# Patient Record
Sex: FEMALE | Race: BLACK OR AFRICAN AMERICAN
[De-identification: names, ages, dates, MRNs, and addresses within clinical notes are randomized per-mention and may not be internally consistent; named-entity substitution may affect disease eponyms.]

---

## 2017-06-21 ENCOUNTER — HOSPITAL ENCOUNTER (EMERGENCY)
Dept: HOSPITAL 10 - FTE | Age: 37
Discharge: HOME | End: 2017-06-21
Payer: COMMERCIAL

## 2017-06-21 VITALS
HEIGHT: 66 IN | BODY MASS INDEX: 23.56 KG/M2 | BODY MASS INDEX: 23.56 KG/M2 | HEIGHT: 66 IN | WEIGHT: 146.61 LBS | WEIGHT: 146.61 LBS

## 2017-06-21 VITALS — DIASTOLIC BLOOD PRESSURE: 72 MMHG | SYSTOLIC BLOOD PRESSURE: 154 MMHG | RESPIRATION RATE: 18 BRPM | HEART RATE: 75 BPM

## 2017-06-21 DIAGNOSIS — R07.9: ICD-10-CM

## 2017-06-21 DIAGNOSIS — R10.9: Primary | ICD-10-CM

## 2017-06-21 LAB
ADD SCAN DIFF: NO
ADD UMIC: YES
ALBUMIN SERPL-MCNC: 4.8 G/DL (ref 3.3–4.9)
ALBUMIN/GLOB SERPL: 1.6 {RATIO}
ALP SERPL-CCNC: 56 IU/L (ref 42–121)
ALT SERPL-CCNC: 51 IU/L (ref 13–69)
ANION GAP SERPL CALC-SCNC: 13 MMOL/L (ref 8–16)
AST SERPL-CCNC: 49 IU/L (ref 15–46)
BASOPHILS # BLD AUTO: 0 10^3/UL (ref 0–0.1)
BASOPHILS NFR BLD: 1 % (ref 0–2)
BILIRUB DIRECT SERPL-MCNC: 0 MG/DL (ref 0–0.2)
BILIRUB SERPL-MCNC: 0 MG/DL (ref 0.2–1.3)
BUN SERPL-MCNC: 12 MG/DL (ref 7–20)
CALCIUM SERPL-MCNC: 8.9 MG/DL (ref 8.4–10.2)
CHLORIDE SERPL-SCNC: 104 MMOL/L (ref 97–110)
CO2 SERPL-SCNC: 25 MMOL/L (ref 21–31)
COLOR UR: (no result)
CREAT SERPL-MCNC: 0.74 MG/DL (ref 0.44–1)
EOSINOPHIL # BLD: 0.1 10^3/UL (ref 0–0.5)
EOSINOPHIL NFR BLD: 2.4 % (ref 0–7)
ERYTHROCYTE [DISTWIDTH] IN BLOOD BY AUTOMATED COUNT: 15.4 % (ref 11.5–14.5)
GLOBULIN SER-MCNC: 3 G/DL (ref 1.3–3.2)
GLUCOSE SERPL-MCNC: 78 MG/DL (ref 70–220)
GLUCOSE UR STRIP-MCNC: NEGATIVE %
HCT VFR BLD CALC: 35.4 % (ref 37–47)
HGB BLD-MCNC: 11.6 G/DL (ref 12–16)
KETONES UR STRIP.AUTO-MCNC: NEGATIVE MG/DL
LYMPHOCYTES # BLD AUTO: 1.4 10^3/UL (ref 0.8–2.9)
LYMPHOCYTES NFR BLD AUTO: 37.3 % (ref 15–51)
MCH RBC QN AUTO: 29.9 PG (ref 29–33)
MCHC RBC AUTO-ENTMCNC: 32.8 G/DL (ref 32–37)
MCV RBC AUTO: 91.2 FL (ref 82–101)
MONOCYTES # BLD: 0.4 10^3/UL (ref 0.3–0.9)
MONOCYTES NFR BLD: 11.3 % (ref 0–11)
NEUTROPHILS # BLD: 1.8 10^3/UL (ref 1.6–7.5)
NEUTROPHILS NFR BLD AUTO: 47.7 % (ref 39–77)
NITRITE UR QL STRIP.AUTO: NEGATIVE
NRBC # BLD MANUAL: 0 10^3/UL (ref 0–0)
NRBC BLD QL: 0 /100WBC (ref 0–0)
PLATELET # BLD: 245 10^3/UL (ref 140–415)
PMV BLD AUTO: 10.5 FL (ref 7.4–10.4)
POTASSIUM SERPL-SCNC: 4.8 MMOL/L (ref 3.5–5.1)
PROT SERPL-MCNC: 7.8 G/DL (ref 6.1–8.1)
RBC # BLD AUTO: 3.88 10^6/UL (ref 4.2–5.4)
RBC # UR AUTO: (no result) /UL
RBC #/AREA URNS HPF: (no result) /HPF
SODIUM SERPL-SCNC: 137 MMOL/L (ref 135–144)
SQUAMOUS #/AREA URNS HPF: (no result) /HPF
UR BILIRUBIN (DIP): NEGATIVE
UR CLARITY: CLEAR
UR TOTAL PROTEIN (DIP): NEGATIVE
UROBILINOGEN UR STRIP-ACNC: (no result) (ref 0.1–1)
WBC # BLD AUTO: 3.8 10^3/UL (ref 4.8–10.8)
WBC # UR STRIP: NEGATIVE /UL

## 2017-06-21 PROCEDURE — 81001 URINALYSIS AUTO W/SCOPE: CPT

## 2017-06-21 PROCEDURE — 80053 COMPREHEN METABOLIC PANEL: CPT

## 2017-06-21 PROCEDURE — 74177 CT ABD & PELVIS W/CONTRAST: CPT

## 2017-06-21 PROCEDURE — 84703 CHORIONIC GONADOTROPIN ASSAY: CPT

## 2017-06-21 PROCEDURE — 93005 ELECTROCARDIOGRAM TRACING: CPT

## 2017-06-21 PROCEDURE — 85025 COMPLETE CBC W/AUTO DIFF WBC: CPT

## 2017-06-21 PROCEDURE — 71010: CPT

## 2017-06-21 PROCEDURE — 83690 ASSAY OF LIPASE: CPT

## 2017-06-21 NOTE — ERD
ER Documentation


Chief Complaint


Date/Time


DATE: 17 


TIME: 17:50


Chief Complaint


has ap hx of hernia and cp that feels like pressure hx of anxiety 


 


 (JOANNE FOWLER)





HPI


This is a 36-year-old female presents to the ER with abdominal pain that is 

sharp in quality located in the mid abdomen. Patient has had this pain over the 

last 2 days. She denies nausea vomiting or diarrhea. She does admit to a 

squeezing chest pain with a little bit of shortness of breath. She denies any 

recent travel she denies any leg pain or leg swelling. She does not have any 

cough or cold symptoms. Patient denies any urinary frequency or dysuria.


 (JOANNE FOWLER)





ROS


All12 point review of systems was done, all negative except per HPI.


 (JOANNE FOWLER)





Medications


Home Meds


Active Scripts


Ibuprofen* (Motrin*) 600 Mg Tab, 600 MG PO Q6, #30 TAB


   Prov:JOANNE FOWLER         17


Hydrocodone/Acetaminophen (Norco 5-325 Tablet) 1 Each Tablet, 1 TAB PO Q6H Y 

for PAIN, #15 TAB


   Prov:JOANNE FOWLER         17





PMhx/Soc


Medical and Surgical Hx:  pt denies Medical Hx, pt denies Surgical Hx


Hx Alcohol Use:  No


Hx Tobacco Use:  No


Smoking Status:  Never smoker


 (JOANNE FOWLER)





Physical Exam


Vitals





Vital Signs








  Date Time  Temp Pulse Resp B/P Pulse Ox O2 Delivery O2 Flow Rate FiO2


 


17 12:45 98.3 86 18 134/83 99   





 (ITZEL TURCIOS PA-C)


Physical Exam


GENERAL: The patient is well developed and appropriate for usual state of health

, in no apparent distress.


HEENT: Atraumatic. 


CHEST: Clear to auscultation bilaterally. There are no rales, wheezes or 

rhonchi. 


HEART: Regular rate and rhythm. No murmurs, clicks, rubs or gallops.


ABDOMEN: Soft, nontender and nondistended. Good bowel sounds. No rebound or 

guarding. No gross peritonitis. No gross organomegaly or masses. No Escalera sign 

or McBurney point tenderness.


BACK: No midline or flank tenderness.


NEURO: Alert and oriented.


SKIN: The skin is warm and dry.


 (JOANNE FOWLER)


Result Diagram:  


17 1625                                                                   

             6/21/17 1524





Results 24 hrs





 Laboratory Tests








Test


  17


15:06 17


15:24 17


16:25


 


Urine Color LT. YELLOW   


 


Urine Clarity CLEAR   


 


Urine pH    


 


Urine Specific Gravity    


 


Urine Ketones NEGATIVE   


 


Urine Nitrite NEGATIVE   


 


Urine Bilirubin NEGATIVE   


 


Urine Urobilinogen 0.2  E.U./dL   


 


Urine Leukocyte Esterase NEGATIVE   


 


Urine Microscopic RBC 0-2/HPF   


 


Urine Microscopic WBC 0-2/HPF   


 


Urine Squamous Epithelial


Cells MODERATE/HPF 


  


  


 


 


Urine Hemoglobin 1+   


 


Urine Glucose NEGATIVE%   


 


Urine Total Protein NEGATIVE   


 


Urine Pregnancy Test NEGATIVE   


 


Sodium Level  137mmol/L  


 


Potassium Level  4.8mmol/L  


 


Chloride Level  104mmol/L  


 


Carbon Dioxide Level  25mmol/L  


 


Anion Gap  13  


 


Blood Urea Nitrogen  12mg/dl  


 


Creatinine  0.74mg/dl  


 


Glucose Level  78mg/dl  


 


Calcium Level  8.9mg/dl  


 


Total Bilirubin  0.0mg/dl  


 


Direct Bilirubin  0.00mg/dl  


 


Indirect Bilirubin  0.0mg/dl  


 


Aspartate Amino Transf


(AST/SGOT) 


  49IU/L 


  


 


 


Alanine Aminotransferase


(ALT/SGPT) 


  51IU/L 


  


 


 


Alkaline Phosphatase  56IU/L  


 


Total Protein  7.8g/dl  


 


Albumin  4.8g/dl  


 


Globulin  3.00g/dl  


 


Albumin/Globulin Ratio  1.60  


 


Lipase  128U/L  


 


White Blood Count   3.810^3/ul 


 


Red Blood Count   3.8810^6/ul 


 


Hemoglobin   11.6g/dl 


 


Hematocrit   35.4% 


 


Mean Corpuscular Volume   91.2fl 


 


Mean Corpuscular Hemoglobin   29.9pg 


 


Mean Corpuscular Hemoglobin


Concent 


  


  32.8g/dl 


 


 


Red Cell Distribution Width   15.4% 


 


Platelet Count   15788^3/UL 


 


Mean Platelet Volume   10.5fl 


 


Neutrophils %   47.7% 


 


Lymphocytes %   37.3% 


 


Monocytes %   11.3% 


 


Eosinophils %   2.4% 


 


Basophils %   1.0% 


 


Nucleated Red Blood Cells %   0.0/100WBC 


 


Neutrophils #   1.810^3/ul 


 


Lymphocytes #   1.410^3/ul 


 


Monocytes #   0.410^3/ul 


 


Eosinophils #   0.110^3/ul 


 


Basophils #   0.010^3/ul 


 


Nucleated Red Blood Cells #   0.010^3/ul 








 Current Medications








 Medications


  (Trade)  Dose


 Ordered  Sig/Sherron


 Route


 PRN Reason  Start Time


 Stop Time Status Last Admin


Dose Admin


 


 IV Flush 10 ml  10 ml  STK-MED ONCE


 .ROUTE


   17 17:23


 17 17:24 DC 17 18:11


 


 


 Sodium Chloride


  (NS)  100 ml @ ud  STK-MED ONCE


 .ROUTE


   17 17:23


 17 17:24 DC  


 


 


 Iohexol


  (Omnipaque 300mg/


 ml)  150 ml  STK-MED ONCE


 .ROUTE


   17 17:23


 17 17:24 DC 17 18:11


 





 DIAGNOSTIC IMAGING REPORT





 Patient: ARABELLA ABURTO   : 1980   Age: 36  Sex: F                

        


 MR #:    [id number]   Acct #:   [id number]    DOS: 17 1453


 Ordering MD: JOANNE FOWLER PA-C   Location:  [facility]   Room/Bed:              

                              


 








PROCEDURE:   CT abdomen and pelvis with contrast. 


 


CLINICAL INDICATION:   Mid abdominal pain.  Hernia  


 


TECHNIQUE:   CT scan of the abdomen and pelvis with contrast was performed.  

Coronal and sagittal images were also reformatted.  37 cc Omnipaque-300 

intravenous contrast was administered without complication.  Total exam CTDIvol 

= 8.08 mGy and DLP = 403.71 mGy-cm.


 


COMPARISON:   None available. 


 


FINDINGS:


 


Visualized lower thorax: The lung bases are clear.  There is no evidence for 

pleural effusion.


 


Liver, gallbladder, pancreas and spleen:  Mild hepatomegaly of 20 cm is present 

with low attenuation suggesting slight hepatic steatosis, the liver contour is 

preserved.  There is no evidence for liver mass or ductal dilatation.  The 

gallbladder is unremarkable.  No common bile duct dilatation is evident.  The 

pancreas is normal.  The spleen is normal, not enlarged.


 


Adrenal glands and genitourinary system:  The adrenal glands are normal 

bilaterally.  The kidneys are normal in size, contour and attenuation with no 

evidence for masses, calculi or hydronephrosis. Symmetric enhancement of the 

kidneys is present without evidence of pyelonephritis .  The ureters are 

unremarkable.  The urinary bladder shows no abnormality.  Enlarged lobulated 

uterus most consistent with leiomyomatous changes.  There is no evidence of 

ovarian or adnexal mass.  A trace amount of free fluid in the posterior left cul

-de-sac is probably physiologic.  


 


Gastrointestinal system:  The stomach, small bowel and large intestine are 

normal in caliber.  There is no evidence of obstruction, ileus or inflammation.

  The appendix and surrounding fat are normal. A normal amount of fecal debris 

is present within the colon and there is no wall thickening to suggest colitis.


 


Peritoneum, retroperitoneum, vessels and lymph nodes:  The abdominal aorta is 

normal in caliber.  There is no evidence for atherosclerotic calcification.  

Inferior vena cava is normal in caliber.  There is no evidence for adenopathy.  

The peritoneal cavity is normal with no evidence for ascites. A fat containing 

supraumbilical ventral hernia is estimated at 3 cm in greatest dimension the 

defect in the anterior abdominal wall measured at 7 mm in transverse dimension.

  There is no inflammation or fluid within the hernia sac (series 3 images [id number]

). No pneumoperitoneum is present


 


Osseous structures and musculoskeletal system:  There is no evidence for acute 

osseous abnormality or muscular pathology.  No subcutaneous abnormalities are 

present.


 


RPTAT:HJJR


 


IMPRESSION:


 


1. A fat-containing supraumbilical hernia is estimated at 3 cm in greatest 

dimension the defect in the anterior abdominal wall 7 mm per without evidence 

of fluid or inflammation in the hernia sac.


2.  Mild hepatomegaly and hepatic steatosis.


3.  Enlarged fibroid uterus.


_____________________________________________ 


Physician Tejal           Date    Time 


Electronically viewed and signed by Physician Tejal on 2017 18:51 


 


D:  2017 18:51  T:  2017 18:51


JR/





CC: JOANNE FOWLER


 (ITZEL TURCIOS PA-C)





Procedures/MDM


Differential Diagnosis: GERD, gastritis, peptic ulcer disease, pancreatitis, 

cholecystitis, choledocholithiasis, biliary colic, cholangitis, [id number]

, ACS/MI, Pnuemonia. EKG was done 89 bpm no ST elevation no T wave inversion. 

She is blood work is normal with no evidence of leukocytosis or electrolyte 

abnormality. There is no evidence of urinary tract infections. No evidence of 

pneumonia or other intrathoracic abnormality. Suspicion for pulmonary embolism 

is low patient is not have any perc criteria. I am awaiting for CT results.


 (JOANNE FOWLER)


Medical decision makin-year-old female comes in with upper abdominal pain, 

this patient was signed out to me by Joanne Fowler.  The CT abdomen and pelvis 

does show a fat-containing hernia, otherwise unremarkable.  There are no acute 

findings, surgical findings.  Based on her presentation her symptoms are likely 

related to anxiety.  Suspicion for acute coronary syndrome, dissection, 

pulmonary embolus, pancreatitis and among others are part of my differential 

diagnosis however unlikely.


 (ITZEL TURCIOS PA-C)





Departure


Diagnosis:  


 Primary Impression:  


 Abdominal pain


Condition:  Stable











JOANNE FOWLER 2017 17:57


ITZEL TURCIOS PA-C 2017 19:13

## 2018-03-14 ENCOUNTER — HOSPITAL ENCOUNTER (EMERGENCY)
Age: 38
Discharge: HOME | End: 2018-03-14

## 2018-03-14 ENCOUNTER — HOSPITAL ENCOUNTER (EMERGENCY)
Dept: HOSPITAL 91 - FTE | Age: 38
Discharge: HOME | End: 2018-03-14
Payer: COMMERCIAL

## 2018-03-14 DIAGNOSIS — Z87.891: ICD-10-CM

## 2018-03-14 DIAGNOSIS — R55: Primary | ICD-10-CM

## 2018-03-14 DIAGNOSIS — I10: ICD-10-CM

## 2018-03-14 LAB
ADD MAN DIFF?: NO
ADD UMIC: YES
ALANINE AMINOTRANSFERASE: 33 IU/L (ref 13–69)
ALBUMIN/GLOBULIN RATIO: 1.18
ALBUMIN: 3.9 G/DL (ref 3.3–4.9)
ALKALINE PHOSPHATASE: 64 IU/L (ref 42–121)
ANION GAP: 14 (ref 8–16)
ASPARTATE AMINO TRANSFERASE: 32 IU/L (ref 15–46)
BASOPHIL #: 0 10^3/UL (ref 0–0.1)
BASOPHILS %: 0.9 % (ref 0–2)
BILIRUBIN,DIRECT: 0 MG/DL (ref 0–0.2)
BILIRUBIN,TOTAL: 0 MG/DL (ref 0.2–1.3)
BLOOD UREA NITROGEN: 10 MG/DL (ref 7–20)
CALCIUM: 9.1 MG/DL (ref 8.4–10.2)
CARBON DIOXIDE: 24 MMOL/L (ref 21–31)
CHLORIDE: 108 MMOL/L (ref 97–110)
CREATININE: 0.66 MG/DL (ref 0.44–1)
EOSINOPHILS #: 0.1 10^3/UL (ref 0–0.5)
EOSINOPHILS %: 1.4 % (ref 0–7)
GLOBULIN: 3.3 G/DL (ref 1.3–3.2)
GLUCOSE: 106 MG/DL (ref 70–220)
HEMATOCRIT: 32.5 % (ref 37–47)
HEMOGLOBIN: 10.7 G/DL (ref 12–16)
LIPASE: 130 U/L (ref 23–300)
LYMPHOCYTES #: 1.4 10^3/UL (ref 0.8–2.9)
LYMPHOCYTES %: 32 % (ref 15–51)
MEAN CORPUSCULAR HEMOGLOBIN: 28.4 PG (ref 29–33)
MEAN CORPUSCULAR HGB CONC: 32.9 G/DL (ref 32–37)
MEAN CORPUSCULAR VOLUME: 86.2 FL (ref 82–101)
MEAN PLATELET VOLUME: 10.6 FL (ref 7.4–10.4)
MONOCYTE #: 0.4 10^3/UL (ref 0.3–0.9)
MONOCYTES %: 9.2 % (ref 0–11)
NEUTROPHIL #: 2.4 10^3/UL (ref 1.6–7.5)
NEUTROPHILS %: 56 % (ref 39–77)
NUCLEATED RED BLOOD CELLS #: 0 10^3/UL (ref 0–0)
NUCLEATED RED BLOOD CELLS%: 0 /100WBC (ref 0–0)
PLATELET COUNT: 234 10^3/UL (ref 140–415)
POTASSIUM: 4.3 MMOL/L (ref 3.5–5.1)
RED BLOOD COUNT: 3.77 10^6/UL (ref 4.2–5.4)
RED CELL DISTRIBUTION WIDTH: 17.1 % (ref 11.5–14.5)
SODIUM: 142 MMOL/L (ref 135–144)
TOTAL PROTEIN: 7.2 G/DL (ref 6.1–8.1)
UR ASCORBIC ACID: NEGATIVE MG/DL
UR BACTERIA: (no result) /HPF
UR BILIRUBIN (DIP): NEGATIVE MG/DL
UR BLOOD (DIP): NEGATIVE MG/DL
UR CLARITY: (no result)
UR COLOR: YELLOW
UR GLUCOSE (DIP): NEGATIVE MG/DL
UR KETONES (DIP): NEGATIVE MG/DL
UR LEUKOCYTE ESTERASE (DIP): (no result) LEU/UL
UR MUCUS: (no result) /HPF
UR NITRITE (DIP): NEGATIVE MG/DL
UR PH (DIP): 5 (ref 5–9)
UR RBC: 0 /HPF (ref 0–5)
UR SPECIFIC GRAVITY (DIP): 1.02 (ref 1–1.03)
UR SQUAMOUS EPITHELIAL CELL: (no result) /HPF
UR TOTAL PROTEIN (DIP): NEGATIVE MG/DL
UR UROBILINOGEN (DIP): NEGATIVE MG/DL
UR WBC: 1 /HPF (ref 0–5)
WHITE BLOOD COUNT: 4.4 10^3/UL (ref 4.8–10.8)

## 2018-03-14 PROCEDURE — 81001 URINALYSIS AUTO W/SCOPE: CPT

## 2018-03-14 PROCEDURE — 80053 COMPREHEN METABOLIC PANEL: CPT

## 2018-03-14 PROCEDURE — 85025 COMPLETE CBC W/AUTO DIFF WBC: CPT

## 2018-03-14 PROCEDURE — 81025 URINE PREGNANCY TEST: CPT

## 2018-03-14 PROCEDURE — 36415 COLL VENOUS BLD VENIPUNCTURE: CPT

## 2018-03-14 PROCEDURE — 93005 ELECTROCARDIOGRAM TRACING: CPT

## 2018-03-14 PROCEDURE — 99285 EMERGENCY DEPT VISIT HI MDM: CPT

## 2018-03-14 PROCEDURE — 70450 CT HEAD/BRAIN W/O DYE: CPT

## 2018-03-14 PROCEDURE — 83690 ASSAY OF LIPASE: CPT

## 2018-03-14 RX ADMIN — HYDROCODONE BITARTRATE AND ACETAMINOPHEN 1 TAB: 5; 325 TABLET ORAL at 07:04

## 2022-10-11 NOTE — RADRPT
PROCEDURE:   CT abdomen and pelvis with contrast. 

 

CLINICAL INDICATION:   Mid abdominal pain.  Hernia  

 

TECHNIQUE:   CT scan of the abdomen and pelvis with contrast was performed.  Coronal and sagittal im
ages were also reformatted.  37 cc Omnipaque-300 intravenous contrast was administered without compl
ication.  Total exam CTDIvol = 8.08 mGy and DLP = 403.71 mGy-cm.

 

COMPARISON:   None available. 

 

FINDINGS:

 

Visualized lower thorax: The lung bases are clear.  There is no evidence for pleural effusion.

 

Liver, gallbladder, pancreas and spleen:  Mild hepatomegaly of 20 cm is present with low attenuation
 suggesting slight hepatic steatosis, the liver contour is preserved.  There is no evidence for live
r mass or ductal dilatation.  The gallbladder is unremarkable.  No common bile duct dilatation is ev
ident.  The pancreas is normal.  The spleen is normal, not enlarged.

 

Adrenal glands and genitourinary system:  The adrenal glands are normal bilaterally.  The kidneys ar
e normal in size, contour and attenuation with no evidence for masses, calculi or hydronephrosis. Sy
mmetric enhancement of the kidneys is present without evidence of pyelonephritis .  The ureters are 
unremarkable.  The urinary bladder shows no abnormality.  Enlarged lobulated uterus most consistent 
with leiomyomatous changes.  There is no evidence of ovarian or adnexal mass.  A trace amount of sulma
e fluid in the posterior left cul-de-sac is probably physiologic.  

 

Gastrointestinal system:  The stomach, small bowel and large intestine are normal in caliber.  There
 is no evidence of obstruction, ileus or inflammation.  The appendix and surrounding fat are normal.
 A normal amount of fecal debris is present within the colon and there is no wall thickening to sugg
est colitis.

 

Peritoneum, retroperitoneum, vessels and lymph nodes:  The abdominal aorta is normal in caliber.  Th
ere is no evidence for atherosclerotic calcification.  Inferior vena cava is normal in caliber.  The
re is no evidence for adenopathy.  The peritoneal cavity is normal with no evidence for ascites. A f
at containing supraumbilical ventral hernia is estimated at 3 cm in greatest dimension the defect in
 the anterior abdominal wall measured at 7 mm in transverse dimension.  There is no inflammation or 
fluid within the hernia sac (series 3 images 83-88). No pneumoperitoneum is present

 

Osseous structures and musculoskeletal system:  There is no evidence for acute osseous abnormality o
r muscular pathology.  No subcutaneous abnormalities are present.

 

RPTAT:HJJR

 

IMPRESSION:

 

1. A fat-containing supraumbilical hernia is estimated at 3 cm in greatest dimension the defect in t
he anterior abdominal wall 7 mm per without evidence of fluid or inflammation in the hernia sac.

2.  Mild hepatomegaly and hepatic steatosis.

3.  Enlarged fibroid uterus.

_____________________________________________ 

Physician Tejal           Date    Time 

Electronically viewed and signed by Isai López Physician on 06/21/2017 18:51 

 

D:  06/21/2017 18:51  T:  06/21/2017 18:51

JR/
PROCEDURE:   Chest x-ray 

 

CLINICAL INDICATION:   Abdominal pain

 

TECHNIQUE:   Chest single view

 

COMPARISON:   None 

 

FINDINGS:

The heart is normal in size.  The pulmonary vessels are normal in caliber.  The lungs are clear.  Th
e costophrenic angles are sharp.  The visualized bony thorax is unremarkable. 

 

IMPRESSION:

 

No acute cardiopulmonary disease. 

 

 

RPTAT: HH

_____________________________________________ 

.Abdi Holt MD, MD           Date    Time 

Electronically viewed and signed by .Abdi Holt MD, MD on 06/21/2017 15:55 

 

D:  06/21/2017 15:55  T:  06/21/2017 15:55

.W/
Detail Level: Generalized
Detail Level: Zone
Detail Level: Simple
When Should The Patient Follow-Up For Their Next Full-Body Skin Exam?: 6 Months
Quality 137: Melanoma: Continuity Of Care - Recall System: Patient information entered into a recall system that includes: target date for the next exam specified AND a process to follow up with patients regarding missed or unscheduled appointments